# Patient Record
Sex: MALE | Race: WHITE
[De-identification: names, ages, dates, MRNs, and addresses within clinical notes are randomized per-mention and may not be internally consistent; named-entity substitution may affect disease eponyms.]

---

## 2018-04-10 ENCOUNTER — HOSPITAL ENCOUNTER (OUTPATIENT)
Dept: HOSPITAL 76 - LAB.F | Age: 83
End: 2018-04-10
Attending: INTERNAL MEDICINE
Payer: MEDICARE

## 2018-04-10 DIAGNOSIS — I10: Primary | ICD-10-CM

## 2018-04-10 DIAGNOSIS — E78.5: ICD-10-CM

## 2018-04-10 DIAGNOSIS — I48.2: ICD-10-CM

## 2018-04-10 PROCEDURE — 80053 COMPREHEN METABOLIC PANEL: CPT

## 2018-04-10 PROCEDURE — 83721 ASSAY OF BLOOD LIPOPROTEIN: CPT

## 2018-04-10 PROCEDURE — 36415 COLL VENOUS BLD VENIPUNCTURE: CPT

## 2018-04-10 PROCEDURE — 80061 LIPID PANEL: CPT

## 2018-04-10 PROCEDURE — 84443 ASSAY THYROID STIM HORMONE: CPT

## 2018-04-11 LAB
ALBUMIN DIAFP-MCNC: 4.1 G/DL (ref 3.2–5.5)
ALBUMIN/GLOB SERPL: 1.6 {RATIO} (ref 1–2.2)
ALP SERPL-CCNC: 64 IU/L (ref 42–121)
ALT SERPL W P-5'-P-CCNC: 19 IU/L (ref 10–60)
ANION GAP SERPL CALCULATED.4IONS-SCNC: 5 MMOL/L (ref 6–13)
AST SERPL W P-5'-P-CCNC: 22 IU/L (ref 10–42)
BILIRUB BLD-MCNC: 0.8 MG/DL (ref 0.2–1)
BUN SERPL-MCNC: 17 MG/DL (ref 6–20)
CALCIUM UR-MCNC: 8.9 MG/DL (ref 8.5–10.3)
CHLORIDE SERPL-SCNC: 104 MMOL/L (ref 101–111)
CHOLEST SERPL-MCNC: 112 MG/DL
CO2 SERPL-SCNC: 29 MMOL/L (ref 21–32)
CREAT SERPLBLD-SCNC: 1.2 MG/DL (ref 0.6–1.2)
GFRSERPLBLD MDRD-ARVRAT: 57 ML/MIN/{1.73_M2} (ref 89–?)
GLOBULIN SER-MCNC: 2.6 G/DL (ref 2.1–4.2)
GLUCOSE SERPL-MCNC: 112 MG/DL (ref 70–100)
HDLC SERPL-MCNC: 33 MG/DL
HDLC SERPL: 3.4 {RATIO} (ref ?–5)
LDLC SERPL CALC-MCNC: 49 MG/DL
LDLC/HDLC SERPL: 1.5 {RATIO} (ref ?–3.6)
PROT SPEC-MCNC: 6.7 G/DL (ref 6.7–8.2)
SODIUM SERPLBLD-SCNC: 138 MMOL/L (ref 135–145)
VLDLC SERPL-SCNC: 30 MG/DL

## 2018-09-23 ENCOUNTER — HOSPITAL ENCOUNTER (EMERGENCY)
Dept: HOSPITAL 76 - ED | Age: 83
Discharge: ANOTHER HOSPITAL | End: 2018-09-23
Payer: MEDICARE

## 2018-09-23 VITALS — SYSTOLIC BLOOD PRESSURE: 172 MMHG | DIASTOLIC BLOOD PRESSURE: 51 MMHG

## 2018-09-23 DIAGNOSIS — Z79.01: ICD-10-CM

## 2018-09-23 DIAGNOSIS — I48.2: ICD-10-CM

## 2018-09-23 DIAGNOSIS — I50.9: ICD-10-CM

## 2018-09-23 DIAGNOSIS — Z86.73: ICD-10-CM

## 2018-09-23 DIAGNOSIS — R00.1: Primary | ICD-10-CM

## 2018-09-23 DIAGNOSIS — I11.0: ICD-10-CM

## 2018-09-23 DIAGNOSIS — J44.9: ICD-10-CM

## 2018-09-23 LAB
ALBUMIN DIAFP-MCNC: 4.2 G/DL (ref 3.2–5.5)
ALBUMIN/GLOB SERPL: 1.4 {RATIO} (ref 1–2.2)
ALP SERPL-CCNC: 85 IU/L (ref 42–121)
ALT SERPL W P-5'-P-CCNC: 15 IU/L (ref 10–60)
ANION GAP SERPL CALCULATED.4IONS-SCNC: 8 MMOL/L (ref 6–13)
AST SERPL W P-5'-P-CCNC: 20 IU/L (ref 10–42)
BASOPHILS NFR BLD AUTO: 0 10^3/UL (ref 0–0.1)
BASOPHILS NFR BLD AUTO: 0.6 %
BILIRUB BLD-MCNC: 1.6 MG/DL (ref 0.2–1)
BUN SERPL-MCNC: 16 MG/DL (ref 6–20)
CALCIUM UR-MCNC: 8.7 MG/DL (ref 8.5–10.3)
CHLORIDE SERPL-SCNC: 101 MMOL/L (ref 101–111)
CLARITY UR REFRACT.AUTO: CLEAR
CO2 SERPL-SCNC: 29 MMOL/L (ref 21–32)
CREAT SERPLBLD-SCNC: 1.2 MG/DL (ref 0.6–1.2)
EOSINOPHIL # BLD AUTO: 0.1 10^3/UL (ref 0–0.7)
EOSINOPHIL NFR BLD AUTO: 1.9 %
ERYTHROCYTE [DISTWIDTH] IN BLOOD BY AUTOMATED COUNT: 14.6 % (ref 12–15)
GFRSERPLBLD MDRD-ARVRAT: 57 ML/MIN/{1.73_M2} (ref 89–?)
GLOBULIN SER-MCNC: 3.1 G/DL (ref 2.1–4.2)
GLUCOSE SERPL-MCNC: 151 MG/DL (ref 70–100)
GLUCOSE UR QL STRIP.AUTO: NEGATIVE MG/DL
HGB UR QL STRIP: 14.1 G/DL (ref 14–18)
INR PPP: 1.8 (ref 0.8–1.2)
KETONES UR QL STRIP.AUTO: NEGATIVE MG/DL
LIPASE SERPL-CCNC: 34 U/L (ref 22–51)
LYMPHOCYTES # SPEC AUTO: 1.6 10^3/UL (ref 1.5–3.5)
LYMPHOCYTES NFR BLD AUTO: 23.6 %
MAGNESIUM SERPL-MCNC: 1.9 MG/DL (ref 1.7–2.8)
MCH RBC QN AUTO: 29.9 PG (ref 27–31)
MCHC RBC AUTO-ENTMCNC: 33.4 G/DL (ref 32–36)
MCV RBC AUTO: 89.4 FL (ref 80–94)
MONOCYTES # BLD AUTO: 0.4 10^3/UL (ref 0–1)
MONOCYTES NFR BLD AUTO: 6.3 %
NEUTROPHILS # BLD AUTO: 4.7 10^3/UL (ref 1.5–6.6)
NEUTROPHILS # SNV AUTO: 7 X10^3/UL (ref 4.8–10.8)
NEUTROPHILS NFR BLD AUTO: 67.6 %
NITRITE UR QL STRIP.AUTO: NEGATIVE
PDW BLD AUTO: 8.4 FL (ref 7.4–11.4)
PH UR STRIP.AUTO: 7 PH (ref 5–7.5)
PLATELET # BLD: 197 10^3/UL (ref 130–450)
PROT SPEC-MCNC: 7.3 G/DL (ref 6.7–8.2)
PROT UR STRIP.AUTO-MCNC: NEGATIVE MG/DL
PROTHROM ACT/NOR PPP: 20.1 SECS (ref 9.9–12.6)
RBC # UR STRIP.AUTO: NEGATIVE /UL
RBC MAR: 4.71 10^6/UL (ref 4.7–6.1)
SODIUM SERPLBLD-SCNC: 138 MMOL/L (ref 135–145)
SP GR UR STRIP.AUTO: 1.01 (ref 1–1.03)
UROBILINOGEN UR QL STRIP.AUTO: (no result) E.U./DL
UROBILINOGEN UR STRIP.AUTO-MCNC: NEGATIVE MG/DL

## 2018-09-23 PROCEDURE — 85025 COMPLETE CBC W/AUTO DIFF WBC: CPT

## 2018-09-23 PROCEDURE — 83690 ASSAY OF LIPASE: CPT

## 2018-09-23 PROCEDURE — 85610 PROTHROMBIN TIME: CPT

## 2018-09-23 PROCEDURE — 93005 ELECTROCARDIOGRAM TRACING: CPT

## 2018-09-23 PROCEDURE — 84484 ASSAY OF TROPONIN QUANT: CPT

## 2018-09-23 PROCEDURE — 83880 ASSAY OF NATRIURETIC PEPTIDE: CPT

## 2018-09-23 PROCEDURE — 36415 COLL VENOUS BLD VENIPUNCTURE: CPT

## 2018-09-23 PROCEDURE — 81001 URINALYSIS AUTO W/SCOPE: CPT

## 2018-09-23 PROCEDURE — 71046 X-RAY EXAM CHEST 2 VIEWS: CPT

## 2018-09-23 PROCEDURE — 87086 URINE CULTURE/COLONY COUNT: CPT

## 2018-09-23 PROCEDURE — 81003 URINALYSIS AUTO W/O SCOPE: CPT

## 2018-09-23 PROCEDURE — 99285 EMERGENCY DEPT VISIT HI MDM: CPT

## 2018-09-23 PROCEDURE — 96374 THER/PROPH/DIAG INJ IV PUSH: CPT

## 2018-09-23 PROCEDURE — 83735 ASSAY OF MAGNESIUM: CPT

## 2018-09-23 PROCEDURE — 80053 COMPREHEN METABOLIC PANEL: CPT

## 2018-09-23 PROCEDURE — 99284 EMERGENCY DEPT VISIT MOD MDM: CPT

## 2018-09-23 PROCEDURE — 85730 THROMBOPLASTIN TIME PARTIAL: CPT

## 2018-09-23 NOTE — ED PHYSICIAN DOCUMENTATION
PD HPI DYSPNEA





- Stated complaint


Stated Complaint: LOW PULSE





- Chief complaint


Chief Complaint: Cardiac





- History obtained from


History obtained from: Patient, Family





- History of Present Illness


Timing - onset: How many days ago (family has noted the patient having some 

general fatigue and mild dyspnea on exertion for several days. He had ankle 

edema yesterday. Took vitals at home and noted HR slow at about 40 and BP 

elevated at 200 systolic. Denies chest pain.)


Timing - onset during: Light activity


Timing - details: Gradual onset, Waxing and waning


Inciting event(s): Other (He does have history of COPD and has noticed dyspnea 

for the last month or 2 which has increased in the last week.  He notices it 

with mild exertion.  He has not noticed orthopnea.  Denies any chest pain with 

that.).  No: Out of meds, URI


Improved by: Rest


Worsened by: Exertion.  No: Laying flat, Coughing


Associated symptoms: Palpitations (history of irregular heart beat/atrial fib, 

and had been noted to be slower lately.), Bilateral edema (the past couple of 

days).  No: Cough, Wheezing, Chest pain / discomfort, Anxiety


Recently seen: Emergency Dept (He was seen in the emergency room 2 months ago at

[facility] with slow heart rate and some fatigue.  His wife states they were 

told to stop his blood pressure medicine lisinopril.  They did notice his heart 

rate seemed to be better over the next several weeks.  They do had not taken it 

consistently but she states they check his blood pressure and heart rate once or

twice a week.  His blood pressure had been 150s systolic and heart rate about 

60.  They noticed the last few days he has had general fatigue and 2 days of 

ankle swelling.  They noticed his blood pressure to be elevated and his heart 

rate bit slower again.  The patient states he previously had been on diltiazem 

but has not taken that for about 6 months but does still have some medicines at 

home.  He is sure that he would not confuse the medications regarding what he is

taking currently.  His medicine list includes losartan as a blood pressure 

medicine.  That is the one he had stopped taking the last 2 months.)





Review of Systems


Constitutional: denies: Fever, Chills, Myalgias


Nose: denies: Rhinorrhea / runny nose, Congestion


Throat: denies: Sore throat


Cardiac: reports: Palpitations, Pedal edema (couplr of days).  denies: Chest 

pain / pressure, Calf pain


Respiratory: reports: Dyspnea.  denies: Cough


GI: denies: Abdominal Pain, Nausea, Vomiting, Diarrhea


: reports: Frequency.  denies: Dysuria


Skin: denies: Rash, Lesions





PD PAST MEDICAL HISTORY





- Past Medical History


Past Medical History: Yes


Cardiovascular: Hypertension


Respiratory: COPD


Neuro: TIA





- Past Surgical History


Past Surgical History: Yes


General: Bowel surgery, Other


Cardiovascular: Lobectomy


HEENT: Tonsil/Adenoidectomy





- Present Medications


Home Medications: 


                                Ambulatory Orders











 Medication  Instructions  Recorded  Confirmed


 


Albuterol 2.5 mg INH Q4H PRN 09/23/18 09/23/18


 


Apixaban [Eliquis] 5 mg PO 09/23/18 


 


Atorvastatin Calcium 40 mg PO 09/23/18 


 


Brimonidine 0.2% Ophth Drops  09/23/18 





[Alphagan P 0.2% Ophth Drops]   


 


Latanoprost 2.5 ml OP 09/23/18 


 


Losartan [Cozaar] 25 mg PO DAILY 09/23/18 09/23/18


 


Multivitamin [Multiple Vitamins]  09/23/18 09/23/18


 


Tiotropium Bromide [Spiriva]  09/23/18 














- Allergies


Allergies/Adverse Reactions: 


                                    Allergies











Allergy/AdvReac Type Severity Reaction Status Date / Time


 


No Known Drug Allergies Allergy   Verified 09/23/18 15:49














- Social History


Does the pt smoke?: No


Smoking Status: Never smoker





PD ED PE NORMAL





- Vitals


Vital signs reviewed: Yes





- General


General: Alert and oriented X 3, No acute distress, Well developed/nourished, 

Other (lying back on cart comfortably. )





- HEENT


HEENT: Pharynx benign





- Neck


Neck: Supple, no meningeal sign, No adenopathy





- Cardiac


Cardiac: No rub, Other (irregular and bardycardic at 40-60 range).  No: RRR, No 

murmur (mild murmur c/w AS.)





- Respiratory


Respiratory: No respiratory distress.  No: Clear bilaterally (fine crackles at 

base only. )





- Abdomen


Abdomen: Soft, Non tender





- Male 


Male : Deferred





- Rectal


Rectal: Deferred





- Back


Back: No CVA TTP





- Derm


Derm: Normal color, Warm and dry





- Extremities


Extremities: No deformity, No tenderness to palpate, Normal ROM s pain, No calf 

tenderness / cord, Other (1+ edema in both lower legs and ankles. )





- Neuro


Neuro: Alert and oriented X 3, No motor deficit, Normal speech





Results





- Vitals


Vitals: 


                               Vital Signs - 24 hr











  09/23/18 09/23/18 09/23/18





  15:40 16:56 18:09


 


Temperature 36.5 C  


 


Heart Rate 40 L 39 L 54 L


 


Respiratory 15 15 19





Rate   


 


Blood Pressure 200/65 H 173/54 H 184/61 H


 


O2 Saturation 95 98 98








                                     Oxygen











O2 Source                      Room air

















- Labs


Labs: 


                                Laboratory Tests











  09/23/18 09/23/18 09/23/18





  15:50 15:50 15:50


 


WBC  7.0  


 


RBC  4.71  


 


Hgb  14.1  


 


Hct  42.1  


 


MCV  89.4  


 


MCH  29.9  


 


MCHC  33.4  


 


RDW  14.6  


 


Plt Count  197  


 


MPV  8.4  


 


Neut # (Auto)  4.7  


 


Lymph # (Auto)  1.6  


 


Mono # (Auto)  0.4  


 


Eos # (Auto)  0.1  


 


Baso # (Auto)  0.0  


 


Absolute Nucleated RBC  0.00  


 


Nucleated RBC %  0.1  


 


PT   20.1 H 


 


INR   1.8 H 


 


APTT   34.9 H 


 


Sodium    138


 


Potassium    3.4 L


 


Chloride    101


 


Carbon Dioxide    29


 


Anion Gap    8.0


 


BUN    16


 


Creatinine    1.2


 


Estimated GFR (MDRD)    57 L


 


Glucose    151 H


 


Calcium    8.7


 


Magnesium    1.9


 


Total Bilirubin    1.6 H


 


AST    20


 


ALT    15


 


Alkaline Phosphatase    85


 


Troponin I   


 


B-Natriuretic Peptide   


 


Total Protein    7.3


 


Albumin    4.2


 


Globulin    3.1


 


Albumin/Globulin Ratio    1.4


 


Lipase    34


 


Urine Color   


 


Urine Clarity   


 


Urine pH   


 


Ur Specific Gravity   


 


Urine Protein   


 


Urine Glucose (UA)   


 


Urine Ketones   


 


Urine Occult Blood   


 


Urine Nitrite   


 


Urine Bilirubin   


 


Urine Urobilinogen   


 


Ur Leukocyte Esterase   


 


Ur Microscopic Review   


 


Urine Culture Comments   














  09/23/18 09/23/18 09/23/18





  15:50 15:50 16:30


 


WBC   


 


RBC   


 


Hgb   


 


Hct   


 


MCV   


 


MCH   


 


MCHC   


 


RDW   


 


Plt Count   


 


MPV   


 


Neut # (Auto)   


 


Lymph # (Auto)   


 


Mono # (Auto)   


 


Eos # (Auto)   


 


Baso # (Auto)   


 


Absolute Nucleated RBC   


 


Nucleated RBC %   


 


PT   


 


INR   


 


APTT   


 


Sodium   


 


Potassium   


 


Chloride   


 


Carbon Dioxide   


 


Anion Gap   


 


BUN   


 


Creatinine   


 


Estimated GFR (MDRD)   


 


Glucose   


 


Calcium   


 


Magnesium   


 


Total Bilirubin   


 


AST   


 


ALT   


 


Alkaline Phosphatase   


 


Troponin I  0.06  


 


B-Natriuretic Peptide   830 H 


 


Total Protein   


 


Albumin   


 


Globulin   


 


Albumin/Globulin Ratio   


 


Lipase   


 


Urine Color    YELLOW


 


Urine Clarity    CLEAR


 


Urine pH    7.0


 


Ur Specific Gravity    1.010


 


Urine Protein    NEGATIVE


 


Urine Glucose (UA)    NEGATIVE


 


Urine Ketones    NEGATIVE


 


Urine Occult Blood    NEGATIVE


 


Urine Nitrite    NEGATIVE


 


Urine Bilirubin    NEGATIVE


 


Urine Urobilinogen    0.2 (NORMAL)


 


Ur Leukocyte Esterase    NEGATIVE


 


Ur Microscopic Review    NOT INDICATED


 


Urine Culture Comments    NOT INDICATED














PD MEDICAL DECISION MAKING





- ED course


Complexity details: reviewed results, considered differential (He is not on any 

rate limiting medications nor antiarrhythmics.  He does have a slow heart rate 

in his newer for him with the parent family stating his heart rate has been in 

the 60 range for the last couple of months and is just slower noted the last few

 days.  He is apparently in congestive failure related to this.  I talked with 

cardiology at [facility], Dr. Silva who states the patient needs to be 

assessed for potential pacemaker placement and refers to their hospitalist.  I 

talked to Dr. Banda hospitalist at [facility] who accepts transfer.), d/w 

patient, d/w family





- Sepsis Event


Vital Signs: 


                               Vital Signs - 24 hr











  09/23/18 09/23/18 09/23/18





  15:40 16:56 18:09


 


Temperature 36.5 C  


 


Heart Rate 40 L 39 L 54 L


 


Respiratory 15 15 19





Rate   


 


Blood Pressure 200/65 H 173/54 H 184/61 H


 


O2 Saturation 95 98 98








                                     Oxygen











O2 Source                      Room air

















Departure





- Departure


Disposition: 02 Transfer Acute Care Hosp


Clinical Impression: 


 Bradycardia, Atrial fibrillation, chronic, Sick sinus syndrome





Acute exacerbation of CHF (congestive heart failure)


Qualifiers:


 Heart failure type: unspecified Qualified Code(s): I50.9 - Heart failure, 

unspecified





Condition: Stable


Record reviewed to determine appropriate education?: Yes

## 2018-09-23 NOTE — XRAY REPORT
Reason:  dyspnea and some leg swelling

Procedure Date:  09/23/2018   

Accession Number:  [id number] / [id number]                    

Procedure:  XR  - Chest 2 View X-Ray CPT Code:  87364

 

FULL RESULT:

 

 

EXAM:

CHEST RADIOGRAPHY

 

EXAM DATE: 9/23/2018 04:28 PM.

 

CLINICAL HISTORY: Dyspnea and some leg swelling.

 

COMPARISON: None.

 

TECHNIQUE: 2 views.

 

FINDINGS:

Lungs/Pleura: The lungs are hyperinflated. There is blunting of the right 

costophrenic sulcus. Adjacent streaky right lung base opacities. No focal 

consolidation. No edema. Lungs are hyperinflated. No pneumothorax.

 

Mediastinum: The heart is mildly enlarged.

 

Other: None.

IMPRESSION: Right pleural thickening versus small effusion with adjacent 

right lung base scarring. No focal consolidation or edema.

 

RADIA

## 2018-09-27 ENCOUNTER — HOSPITAL ENCOUNTER (OUTPATIENT)
Dept: HOSPITAL 76 - ED | Age: 83
Setting detail: OBSERVATION
LOS: 1 days | Discharge: HOME | End: 2018-09-28
Attending: NURSE PRACTITIONER | Admitting: INTERNAL MEDICINE
Payer: MEDICARE

## 2018-09-27 DIAGNOSIS — J44.9: ICD-10-CM

## 2018-09-27 DIAGNOSIS — G45.9: Primary | ICD-10-CM

## 2018-09-27 DIAGNOSIS — I10: ICD-10-CM

## 2018-09-27 DIAGNOSIS — I48.2: ICD-10-CM

## 2018-09-27 DIAGNOSIS — I34.0: ICD-10-CM

## 2018-09-27 DIAGNOSIS — Z79.82: ICD-10-CM

## 2018-09-27 DIAGNOSIS — R35.1: ICD-10-CM

## 2018-09-27 DIAGNOSIS — H91.90: ICD-10-CM

## 2018-09-27 DIAGNOSIS — Z87.891: ICD-10-CM

## 2018-09-27 DIAGNOSIS — Z79.01: ICD-10-CM

## 2018-09-27 DIAGNOSIS — Z95.0: ICD-10-CM

## 2018-09-27 LAB
ANION GAP SERPL CALCULATED.4IONS-SCNC: 8 MMOL/L (ref 6–13)
BASOPHILS NFR BLD AUTO: 0 10^3/UL (ref 0–0.1)
BASOPHILS NFR BLD AUTO: 0.3 %
BUN SERPL-MCNC: 15 MG/DL (ref 6–20)
CALCIUM UR-MCNC: 9.1 MG/DL (ref 8.5–10.3)
CHLORIDE SERPL-SCNC: 99 MMOL/L (ref 101–111)
CLARITY UR REFRACT.AUTO: CLEAR
CO2 SERPL-SCNC: 27 MMOL/L (ref 21–32)
CREAT SERPLBLD-SCNC: 1.4 MG/DL (ref 0.6–1.2)
EOSINOPHIL # BLD AUTO: 0.1 10^3/UL (ref 0–0.7)
EOSINOPHIL NFR BLD AUTO: 1.7 %
ERYTHROCYTE [DISTWIDTH] IN BLOOD BY AUTOMATED COUNT: 14.4 % (ref 12–15)
GFRSERPLBLD MDRD-ARVRAT: 48 ML/MIN/{1.73_M2} (ref 89–?)
GLUCOSE SERPL-MCNC: 135 MG/DL (ref 70–100)
GLUCOSE UR QL STRIP.AUTO: NEGATIVE MG/DL
HGB UR QL STRIP: 14.9 G/DL (ref 14–18)
INR PPP: 1.6 (ref 0.8–1.2)
KETONES UR QL STRIP.AUTO: NEGATIVE MG/DL
LYMPHOCYTES # SPEC AUTO: 1.1 10^3/UL (ref 1.5–3.5)
LYMPHOCYTES NFR BLD AUTO: 12.4 %
MCH RBC QN AUTO: 30.1 PG (ref 27–31)
MCHC RBC AUTO-ENTMCNC: 34.1 G/DL (ref 32–36)
MCV RBC AUTO: 88.4 FL (ref 80–94)
MONOCYTES # BLD AUTO: 0.6 10^3/UL (ref 0–1)
MONOCYTES NFR BLD AUTO: 7 %
NEUTROPHILS # BLD AUTO: 6.7 10^3/UL (ref 1.5–6.6)
NEUTROPHILS # SNV AUTO: 8.6 X10^3/UL (ref 4.8–10.8)
NEUTROPHILS NFR BLD AUTO: 78.6 %
NITRITE UR QL STRIP.AUTO: NEGATIVE
PDW BLD AUTO: 8.2 FL (ref 7.4–11.4)
PH UR STRIP.AUTO: 6 PH (ref 5–7.5)
PLATELET # BLD: 181 10^3/UL (ref 130–450)
PROT UR STRIP.AUTO-MCNC: NEGATIVE MG/DL
PROTHROM ACT/NOR PPP: 17.5 SECS (ref 9.9–12.6)
RBC # UR STRIP.AUTO: NEGATIVE /UL
RBC MAR: 4.94 10^6/UL (ref 4.7–6.1)
SODIUM SERPLBLD-SCNC: 134 MMOL/L (ref 135–145)
SP GR UR STRIP.AUTO: 1.02 (ref 1–1.03)
UROBILINOGEN UR QL STRIP.AUTO: (no result) E.U./DL
UROBILINOGEN UR STRIP.AUTO-MCNC: NEGATIVE MG/DL

## 2018-09-27 PROCEDURE — 70498 CT ANGIOGRAPHY NECK: CPT

## 2018-09-27 PROCEDURE — 93306 TTE W/DOPPLER COMPLETE: CPT

## 2018-09-27 PROCEDURE — 87086 URINE CULTURE/COLONY COUNT: CPT

## 2018-09-27 PROCEDURE — 70496 CT ANGIOGRAPHY HEAD: CPT

## 2018-09-27 PROCEDURE — 85025 COMPLETE CBC W/AUTO DIFF WBC: CPT

## 2018-09-27 PROCEDURE — 81003 URINALYSIS AUTO W/O SCOPE: CPT

## 2018-09-27 PROCEDURE — 80053 COMPREHEN METABOLIC PANEL: CPT

## 2018-09-27 PROCEDURE — 80048 BASIC METABOLIC PNL TOTAL CA: CPT

## 2018-09-27 PROCEDURE — 99284 EMERGENCY DEPT VISIT MOD MDM: CPT

## 2018-09-27 PROCEDURE — 93005 ELECTROCARDIOGRAM TRACING: CPT

## 2018-09-27 PROCEDURE — 81001 URINALYSIS AUTO W/SCOPE: CPT

## 2018-09-27 PROCEDURE — 36415 COLL VENOUS BLD VENIPUNCTURE: CPT

## 2018-09-27 PROCEDURE — 70450 CT HEAD/BRAIN W/O DYE: CPT

## 2018-09-27 PROCEDURE — 83735 ASSAY OF MAGNESIUM: CPT

## 2018-09-27 PROCEDURE — 85610 PROTHROMBIN TIME: CPT

## 2018-09-27 PROCEDURE — 84484 ASSAY OF TROPONIN QUANT: CPT

## 2018-09-27 RX ADMIN — SODIUM CHLORIDE, PRESERVATIVE FREE SCH ML: 5 INJECTION INTRAVENOUS at 15:39

## 2018-09-27 RX ADMIN — APIXABAN SCH MG: 5 TABLET, FILM COATED ORAL at 20:03

## 2018-09-27 RX ADMIN — BRIMONIDINE TARTRATE SCH DROPS: 2 SOLUTION OPHTHALMIC at 20:04

## 2018-09-27 RX ADMIN — SODIUM CHLORIDE SCH MLS/HR: 9 INJECTION, SOLUTION INTRAVENOUS at 15:39

## 2018-09-27 NOTE — ED PHYSICIAN DOCUMENTATION
History of Present Illness





- Stated complaint


Stated Complaint: CONFUSION/DISORIENTED





- Chief complaint


Chief Complaint: Neuro





- Additonal information


Additional information: 





hx from pt


86 male


2 days sp PPM placement at [facility]


to ED today for 10-15 min of expressive aphasia


no facial droop or unilateral numbness or weakness





Review of Systems


Constitutional: denies: Fever


Throat: denies: Sore throat


Cardiac: denies: Chest pain / pressure


Respiratory: denies: Dyspnea


GI: denies: Abdominal Pain


Neurologic: reports: Difficulty speaking.  denies: Focal weakness, Numbness, 

Headache


Endocrine: reports: Easy bruising / bleeding (off eliquis X 2 days for PPM 

restarted today)





PD PAST MEDICAL HISTORY





- Past Medical History


Cardiovascular: Hypertension


Respiratory: COPD


Neuro: TIA





- Past Surgical History


Past Surgical History: Yes


General: Bowel surgery, Other


Cardiovascular: Lobectomy


HEENT: Tonsil/Adenoidectomy





- Present Medications


Home Medications: 


                                Ambulatory Orders











 Medication  Instructions  Recorded  Confirmed


 


Albuterol 2.5 mg INH Q4H PRN 09/23/18 09/27/18


 


Apixaban [Eliquis] 5 mg PO BID 09/23/18 09/27/18


 


Atorvastatin Calcium 40 mg PO QPM 09/23/18 09/27/18


 


Brimonidine 0.2% Ophth Drops 1 drops EACHEYE BID 09/23/18 09/27/18





[Alphagan P 0.2% Ophth Drops]   


 


Latanoprost 1 drops EACHEYE QPM 09/23/18 09/27/18


 


Losartan [Cozaar] 25 mg PO DAILY 09/23/18 09/27/18


 


Multivitamin [Multiple Vitamins] 1 tab PO DAILY 09/23/18 09/27/18


 


Tiotropium Bromide [Spiriva] 2 cap INH DAILY 09/23/18 09/27/18


 


Metoprolol Succinate 12.5 mg PO DAILY 09/27/18 09/27/18














- Allergies


Allergies/Adverse Reactions: 


                                    Allergies











Allergy/AdvReac Type Severity Reaction Status Date / Time


 


No Known Drug Allergies Allergy   Verified 09/27/18 10:24














- Social History


Does the pt smoke?: No


Smoking Status: Never smoker





PD ED PE NORMAL





- Vitals


Vital signs reviewed: Yes





- Neck


Neck: Supple, no meningeal sign





- Cardiac


Cardiac: RRR, Other (PPM site seems to be healing well)





- Respiratory


Respiratory: No respiratory distress, Clear bilaterally





- Derm


Derm: Normal color





- Neuro


Neuro: Alert and oriented X 3, CNs 2-12 intact, No motor deficit, No sensory 

deficit, Normal speech, Other (right now NIHSS zero)





Results





- Vitals


Vitals: 


                               Vital Signs - 24 hr











  09/27/18





  10:20


 


Temperature 36.3 C L


 


Heart Rate 70


 


Respiratory 16





Rate 


 


Blood Pressure 152/70 H


 


O2 Saturation 98








                                     Oxygen











O2 Source                      Room air

















- EKG (time done)


  ** 1037


Rate: Rate (enter#) (60)


Rhythm: Other (appears to be paced at 60 - wide complex reg and cards states 

that is his PPM rate)





- Labs


Labs: 


                                Laboratory Tests











  09/27/18 09/27/18 09/27/18





  11:24 11:24 11:24


 


WBC  8.6  


 


RBC  4.94  


 


Hgb  14.9  


 


Hct  43.6  


 


MCV  88.4  


 


MCH  30.1  


 


MCHC  34.1  


 


RDW  14.4  


 


Plt Count  181  


 


MPV  8.2  


 


Neut # (Auto)  6.7 H  


 


Lymph # (Auto)  1.1 L  


 


Mono # (Auto)  0.6  


 


Eos # (Auto)  0.1  


 


Baso # (Auto)  0.0  


 


Absolute Nucleated RBC  0.00  


 


Nucleated RBC %  0.0  


 


PT   17.5 H 


 


INR   1.6 H 


 


Sodium    134 L


 


Potassium    4.1


 


Chloride    99 L


 


Carbon Dioxide    27


 


Anion Gap    8.0


 


BUN    15


 


Creatinine    1.4 H


 


Estimated GFR (MDRD)    48 L


 


Glucose    135 H


 


Calcium    9.1


 


Urine Color   


 


Urine Clarity   


 


Urine pH   


 


Ur Specific Gravity   


 


Urine Protein   


 


Urine Glucose (UA)   


 


Urine Ketones   


 


Urine Occult Blood   


 


Urine Nitrite   


 


Urine Bilirubin   


 


Urine Urobilinogen   


 


Ur Leukocyte Esterase   


 


Ur Microscopic Review   


 


Urine Culture Comments   














  09/27/18





  12:00


 


WBC 


 


RBC 


 


Hgb 


 


Hct 


 


MCV 


 


MCH 


 


MCHC 


 


RDW 


 


Plt Count 


 


MPV 


 


Neut # (Auto) 


 


Lymph # (Auto) 


 


Mono # (Auto) 


 


Eos # (Auto) 


 


Baso # (Auto) 


 


Absolute Nucleated RBC 


 


Nucleated RBC % 


 


PT 


 


INR 


 


Sodium 


 


Potassium 


 


Chloride 


 


Carbon Dioxide 


 


Anion Gap 


 


BUN 


 


Creatinine 


 


Estimated GFR (MDRD) 


 


Glucose 


 


Calcium 


 


Urine Color  YELLOW


 


Urine Clarity  CLEAR


 


Urine pH  6.0


 


Ur Specific Gravity  1.020


 


Urine Protein  NEGATIVE


 


Urine Glucose (UA)  NEGATIVE


 


Urine Ketones  NEGATIVE


 


Urine Occult Blood  NEGATIVE


 


Urine Nitrite  NEGATIVE


 


Urine Bilirubin  NEGATIVE


 


Urine Urobilinogen  0.2 (NORMAL)


 


Ur Leukocyte Esterase  NEGATIVE


 


Ur Microscopic Review  NOT INDICATED


 


Urine Culture Comments  NOT INDICATED














- Rads (name of study)


  ** [facility]


Radiology: See rad report (no acute)





PD MEDICAL DECISION MAKING





- ED course


ED course: 





spoke to cardio who does not feel pt sx are due to PPM mechanically at least 

(but had to hold eliquis)


abcd2 score 4 


merits obs for serial neruo exams, echo, vessel imaging


called hospitalist at 1250








- Sepsis Event


Vital Signs: 


                               Vital Signs - 24 hr











  09/27/18





  10:20


 


Temperature 36.3 C L


 


Heart Rate 70


 


Respiratory 16





Rate 


 


Blood Pressure 152/70 H


 


O2 Saturation 98








                                     Oxygen











O2 Source                      Room air

















Departure





- Departure


Disposition: ED Place in Observation


Clinical Impression: 


 TIA (transient ischemic attack)

## 2018-09-27 NOTE — HISTORY & PHYSICAL EXAMINATION
Chief Complaint





- Chief Complaint


Chief Complaint: confusion, memory loss





History of Present Illness





- Admitted From


Admitted From:: ED





- History Obtained From


Records Reviewed: yes


History obtained from: chart review, patient, family


Exam Limitations: none





- History of Present Illness


HPI Comment/Other: 


Adrien Naidu is an 86-year old male with a past medical history of hypertension,

atrial fibrillation, status post pacemaker, COPD, TIA, and nocturia.  The 

patient presented to the ED with his wife who drove him here for TIA symptoms. 

The patient states that he awoke this morning feeling his normal self, and 

suddenly could not speak with acute memory loss.  He did not have any associated

symptoms such as shortness of breath, nausea, vomiting, dizziness, or chest 

pain.  His wife was present and did not notice any ataxia or facial droop.  The 

patient denies a new cough or problems with swallowing.  Imaging of the he

ad/neck did not show any acute abnormalities or evidence of bleeding.  Upon my 

exam the patient has no focal-neuro deficits and has had a resolution of his 

symptoms.  The patient will be admitted to observation for neuro checks, and an 

echocardiogram.





History





- Past Medical History


Cardiovascular: reports: Hypertension


Respiratory: reports: COPD


Neuro: reports: TIA





- Past Surgical History


General: reports: Bowel surgery, Other


Cardiovascular: reports: Lobectomy


HEENT: reports: Tonsil/Adenoidectomy





- Family & Social History


Family History: Mother: , Cancer, Father: , CAD, MI, Sister: 

Alive and Well


Family History Comment/Other: The patient's mother  from cancer, his father 

 from CAD, MI.  He has 2 living sisters who are well, without any known 

chronic illnesses.


Living arrangement: At home


Living Situation: With spouse/s.o.


Social History Notes: The patient is retired from a [employer] in which he worked

there for 40 years.  He was in the service and was stationed in [employer].  He and 

his wife had a son and a daughter.  He and his wife just recently moved in with 

[place], their daughter and their son-in-law and are independent.  The patient 

denies current tobacco, alcohol, or illicit drug use.  He admits to a history of

tobacco dependence from age [age].  He wishes to be a FULL code.





- Substance History


Use: Uses substance without health or social issues: NONE


Abuse: Recurrent use of substance despite neg consequences: NONE


Dependence: Experiences withdrawal or developed tolerances: NONE





- POLST


Patient has POLST: No


POLST Status: Full Code





Meds/Allgy





- Home Medications


Home Medications: 


                                Ambulatory Orders











 Medication  Instructions  Recorded  Confirmed


 


Albuterol 2.5 mg INH Q4H PRN 18


 


Apixaban [Eliquis] 5 mg PO BID 18


 


Atorvastatin Calcium 40 mg PO QPM 18


 


Brimonidine 0.2% Ophth Drops 1 drops EACHEYE BID 18





[Alphagan P 0.2% Ophth Drops]   


 


Latanoprost 1 drops EACHEYE QPM 18


 


Losartan [Cozaar] 25 mg PO DAILY 18


 


Multivitamin [Multiple Vitamins] 1 tab PO DAILY 18


 


Tiotropium Bromide [Spiriva] 2 cap INH DAILY 18


 


Metoprolol Succinate 12.5 mg PO DAILY 18


 


Aspirin [Aspirin EC] 81 mg PO DAILY #30 tablet. 18 














- Allergies


Allergies/Adverse Reactions: 


                                    Allergies











Allergy/AdvReac Type Severity Reaction Status Date / Time


 


No Known Drug Allergies Allergy   Verified 18 10:24














Review of Systems





- Constitutional


Constitutional: reports: Fatigue, Weakness





- Eyes


Eyes: reports: Corrective lenses





- Ears, Nose & Throat


Ears, Nose & Throat: reports: Hearing loss, Hearing aids, Postnasal drainage





- Cardiovascular


Cariovascular: reports: Lightheadedness





- Genitourinary


Genitourinary: reports: Nocturia





- Neurological


Neurological: reports: General weakness, Dizziness, Memory problems, Pre-

existing deficit





- All Other Systems


All Other Systems: reports: Reviewed and negative


Prior Level of Functionality: 


Independent without DME devices.  Was previously mowing the lawn at home.





Exam





- Vital Signs


Reviewed Vital Signs: Yes


Vital Signs: 





                                Vital Signs x48h











  Temp Pulse Resp BP Pulse Ox


 


 18 10:20  36.3 C L  70  16  152/70 H  98














- Physical Exam


General Appearance: positive: No acute distress, Alert


Eyes Bilateral: positive: Normal inspection


ENT: positive: ENT inspection nml, No signs of dehydration


Neck: positive: Nml inspection, Thyroid nml, No JVD


Respiratory: positive: Chest non-tender, No respiratory distress, Breath sounds 

nml


Cardiovascular: positive: Regular rate & rhythm





Conclusion/Plan





- Problem List


(1) TIA (transient ischemic attack)


Conclusion/Plan: 


The patient states that he awoke this morning feeling his normal self, and 

suddenly could not speak with acute memory loss.  He did not have any associated

symptoms such as shortness of breath, nausea, vomiting, dizziness, or chest 

pain.  His wife was present and did not notice any ataxia or facial droop.  The 

patient denies a new cough or problems with swallowing.  Imaging of the 

head/neck did not show any acute abnormalities or evidence of bleeding. 


Plan:  Continue to monitor overnight, telemetry, obtain an echocardiogram.








(2) Memory loss


Conclusion/Plan: 


The patient had memory loss in addition to his temporary expressive aphasia.  

The patient and his wife state that he could not recall common events or 

situations that should have been easily recalled on a normal day.  This symptom 

is no longer evident. 


Plan:  Continue TIA work up.








(3) Expressive aphasia


Conclusion/Plan: 


The patient was noted to have sluggish speech, with word finding difficulties.  

This symptom went along with some short term memory loss.  This has resolved 

upon my exam and the patient is able to articulate accurately the course of doe

nts that led to this hospital stay.


Plan:  Continue TIA work up.








(4) Chronic anticoagulation


Conclusion/Plan: 


The patient is prescribed Eliquis BID, which was resumed just this morning after

being on hold for the previous 72 hours so that the patient could undergo a 

pacemaker implant.  The patient states that between he and his wife, they are 

extremely compliant with taking their medications on time every day.


Plan:  Continue Eliquis as at home and monitor for recurrent symptoms.








(5) Atrial fibrillation, chronic


Conclusion/Plan: 


The patient has had this for several years.  His current EKG and telemetry show 

a paced rhythm in the 60-70's.  He was previously anticoagulated with warfarin, 

but has recently been on Eliquis with good success.  He had an interruption in 

his Eliquis for about 72 hours to get his pacemaker, but this has been resumed.


Plan:  Continue on telemetry, and continue Eliquis.








- Lab Results


Lab results reviewed: Yes


Fish Bones: 


                                 18 05:56





                                 18 05:56





- Diagnostic Imaging Results


Diagnostic Imaging Results: positive: Final report reviewed





- EKG Results


EKG Interpreted Independently: Yes


EKG Findings: 





Paced 60-70's.





Core Measures





- Anticipated LOS


I expect patient to be DC'd or transferred within 96 hours.: Yes





- DVT/VTE - Prophylaxis


VTE/DVT Device ordered at admit?: Yes


VTE/DVT Prophylaxis med ordered at admit?: Yes





- Stroke - Rehab Assessment


Rehab services assessment to be ordered?: No


Not Ordered - Medical Reason: Contraindicated





- AMI - Statin at Admit


Aspirin Prescribed on Admit: Yes

## 2018-09-27 NOTE — CT REPORT
Reason:  Expressive aphasia, TIA

Procedure Date:  09/27/2018   

Accession Number:  [id number] / [id number]                    

Procedure:  CT  - Head Angio CPT Code:  

 

FULL RESULT:

 

 

EXAM:

CT ANGIOGRAM HEAD.

CT SCAN OF THE HEAD WITH CONTRAST.

 

EXAM DATE: 9/27/2018 01:34 PM

 

CLINICAL HISTORY: Expressive aphasia, TIA.

 

COMPARISON: Noncontrast CT head 09/27/2018

 

TECHNIQUE:

- CT Scan Head: Using a multidetector scanner, axial images were acquired 

from the foramen magnum to the skull vertex following contrast 

administration.

- CT Angiogram: Using a multidetector scanner, high-resolution axial 

images were acquired from the skull base through vertex following rapid 

infusion of intravenous contrast. Reformats: Multiplanar MIP reformats 

were reconstructed. Nascet criteria used for stenosis measurement.

 

IV Contrast: 100 cc Isovue-300.

 

In accordance with CT protocol optimization, one or more of the following 

dose reduction techniques were utilized for this exam: automated exposure 

control, adjustment of mA and/or KV based on patient size, or use of 

iterative reconstructive technique.

 

FINDINGS:

NON-CONTRAST HEAD:

Performed and dictated separately

 

POST-CONTRAST HEAD: No abnormal enhancement.

 

CT ANGIOGRAM HEAD:

RIGHT:

Internal Carotid artery: Moderate atherosclerosis right carotid siphon, 

maximal stenosis 30-40%.

 

Anterior Cerebral Artery: Patent without significant stenosis, aneurysm, 

or vascular malformation.

Middle Cerebral Artery: Patent without significant stenosis, aneurysm, or 

vascular malformation.

Posterior Cerebral Artery: Patent without significant stenosis, aneurysm, 

or vascular malformation.

Posterior Communicating Artery: Not clearly visualized, likely 

hypoplastic or aplastic.

 

Vertebral Artery: Patent without significant stenosis. No evidence of 

dissection.

 

LEFT:

Internal Carotid artery: Moderate atherosclerosis left carotid siphon, 

maximal stenosis 20-30%.

 

Anterior Cerebral Artery: Patent without significant stenosis, aneurysm, 

or vascular malformation.

Middle Cerebral Artery: Patent without significant stenosis, aneurysm, or 

vascular malformation.

Posterior Cerebral Artery: Patent without significant stenosis, aneurysm, 

or vascular malformation.

Posterior Communicating Artery: Not clearly visualized, likely 

hypoplastic or aplastic.

 

Vertebral Artery: Patent without significant stenosis. No evidence of 

dissection.

 

CENTRAL:

Anterior Communicating Artery: Patent. No aneurysm.

Basilar Artery: Patent without significant stenosis. No aneurysm.

 

DURAL VENOUS SINUSES AND MAJOR CENTRAL VEINS: Patent.

IMPRESSION:

1. Concurrently obtained noncontrast CT head has been dictated separate.

 

2. No abnormal enhancement on the postcontrast CT head.

 

3. No CTA evidence of high-grade stenosis, large vessel occlusion, acute 

dissection, aneurysm, or vascular malformation within intracranial 

arteries.

 

4. Moderate atherosclerosis right carotid siphon, maximal stenosis 30-40%.

 

5. Moderate atherosclerosis left carotid siphon, maximal stenosis 20-30%.

 

 

RADIA

## 2018-09-27 NOTE — CT REPORT
Reason:  Expressive aphasia, TIA

Procedure Date:  09/27/2018   

Accession Number:  [id number] / [id number]                    

Procedure:  CT  - Neck Angio CPT Code:  

 

FULL RESULT:

 

 

EXAM:

CT ANGIOGRAM NECK

 

EXAM DATE: 9/27/2018 01:34 PM.

 

CLINICAL HISTORY: 86-year-old male. Expressive aphasia, TIA.

 

COMPARISON: Noncontrast CT head and CTA head obtained concurrently

 

TECHNIQUE: Routine axial helical imaging was performed from the skull 

base through the aortic arch. Reconstructions: Routine multiplanar 3D MIP 

reconstructions. IV Contrast: 100 cc Isovue 300. Evaluation of arterial 

stenosis is based on a NASCET method of measurement.

 

In accordance with CT protocol optimization, one or more of the following 

dose reduction techniques were utilized for this exam: automated exposure 

control, adjustment of mA and/or KV based on patient size, or use of 

iterative reconstructive technique.

 

FINDINGS:

Mild atherosclerosis aortic arch, no hemodynamically significant 

stenosis. Mild atherosclerosis proximal right subclavian artery, no 

hemodynamically significant stenosis.

 

Right Carotid: Moderate atherosclerosis right carotid bifurcation and 

right carotid bulb, maximal stenosis of approximately 20%, mild by NASCET 

criteria . Approximately 10-20% narrowing origin of the right external 

carotid artery. The common carotid, internal carotid, and external 

carotid arteries are patent. No evidence of dissection

 

Left Carotid: Moderate to severe atherosclerosis left carotid bifurcation 

and left carotid bulb, maximal stenosis of approximately 50%, moderate by 

NASCET criteria. Approximately 30-40% narrowing proximal left external 

carotid artery. The common carotid, internal carotid, and external 

carotid arteries are patent. No evidence of dissection.

 

Vertebrals: The left vertebral artery is minimally dominant. 

Approximately 30-40% narrowing origin of the left vertebral artery. The 

vertebrobasilar system is otherwise unremarkable

 

Intracranial Circulation: Concurrently obtained CTA head has been 

dictated separately.

 

Other: Moderate centrilobular and paraseptal emphysema. Moderate 

multilevel degenerative spondylosis of the visualized spine, no acute 

fracture or malalignment. There is a 1 cm sclerotic lesion within T1 

vertebral body which is nonspecific, may represent a bone island. The 

visualized soft tissues of the neck demonstrate no acute abnormality.

IMPRESSION:

1. No CTA evidence of high-grade stenosis, large vessel occlusion, acute 

dissection, aneurysm, or vascular malformation within extracranial 

arteries. Concurrently obtained CTA head has been dictated simply.

 

2. Moderate atherosclerosis right carotid bifurcation and right carotid 

bulb, maximal stenosis of approximately 20%, mild by NASCET criteria .

 

3. Approximately 10-20% narrowing origin of the right external carotid 

artery.

 

4. Moderate to severe atherosclerosis left carotid bifurcation and left 

carotid bulb, maximal stenosis of approximately 50%, moderate by NASCET 

criteria.

 

5. Approximately 30-40% narrowing proximal left external carotid artery.

 

6. Approximately 30-40% narrowing origin of the left vertebral artery.

 

 

RADIA

## 2018-09-27 NOTE — CT REPORT
Reason:  tia / aphasia / resoleved

Procedure Date:  09/27/2018   

Accession Number:  [id number] / [id number]                    

Procedure:  CT  - Head W/O Stroke Protocol CPT Code:  

 

FULL RESULT:

 

 

EXAM:

CT HEAD

 

EXAM DATE: 9/27/2018 11:23 AM.

 

CLINICAL HISTORY: TIA / aphasia / resolved.

 

COMPARISON: None.

 

TECHNIQUE: Multiaxial CT images were obtained from the foramen magnum to 

the vertex. Reformats: Coronal. IV contrast: None.

 

In accordance with CT protocol optimization, one or more of the following 

dose reduction techniques were utilized for this exam: automated exposure 

control, adjustment of mA and/or KV based on patient size, or use of 

iterative reconstructive technique.

 

FINDINGS:

Parenchyma: No intraparenchymal hemorrhage. No evidence of mass, midline 

shift, or CT findings of acute infarction. Gray-white differentiation is 

distinct. Diffuse chronic microangiopathic white matter changes are 

evident.

 

Extraaxial Spaces: Normal for age. No subdural or epidural collections 

identified.

 

Ventricles: The ventricles and cortical sulci are enlarged, consistent 

with age-related tissue loss.

 

Sinuses and orbits: Imaged paranasal sinuses, orbits, and mastoids show 

no significant abnormality.

 

Bones: No evidence of fracture or calvarial defect.

 

Other: None.

IMPRESSION: Generalized age-related cortical atrophic changes without 

evidence of acute intracranial abnormality. No intracranial hemorrhage, 

mass-effect, or CT evidence of acute infarct.

 

RADIA

 

The call report notification system was initiated by Dr. Musa Johnson 

at 11:29 hrs on 9/27/18.

 

The above findings  were discussed with Dr. Salmon by Dr. Musa Johnson 

at 11:32 hrs on 9/27/18.

## 2018-09-28 VITALS — DIASTOLIC BLOOD PRESSURE: 76 MMHG | SYSTOLIC BLOOD PRESSURE: 177 MMHG

## 2018-09-28 LAB
ALBUMIN DIAFP-MCNC: 3.5 G/DL (ref 3.2–5.5)
ALBUMIN/GLOB SERPL: 1.2 {RATIO} (ref 1–2.2)
ALP SERPL-CCNC: 80 IU/L (ref 42–121)
ALT SERPL W P-5'-P-CCNC: 12 IU/L (ref 10–60)
ANION GAP SERPL CALCULATED.4IONS-SCNC: 7 MMOL/L (ref 6–13)
AST SERPL W P-5'-P-CCNC: 18 IU/L (ref 10–42)
BASOPHILS NFR BLD AUTO: 0 10^3/UL (ref 0–0.1)
BASOPHILS NFR BLD AUTO: 0.8 %
BILIRUB BLD-MCNC: 1 MG/DL (ref 0.2–1)
BUN SERPL-MCNC: 13 MG/DL (ref 6–20)
CALCIUM UR-MCNC: 8.6 MG/DL (ref 8.5–10.3)
CHLORIDE SERPL-SCNC: 104 MMOL/L (ref 101–111)
CO2 SERPL-SCNC: 25 MMOL/L (ref 21–32)
CREAT SERPLBLD-SCNC: 1 MG/DL (ref 0.6–1.2)
EOSINOPHIL # BLD AUTO: 0.2 10^3/UL (ref 0–0.7)
EOSINOPHIL NFR BLD AUTO: 2.7 %
ERYTHROCYTE [DISTWIDTH] IN BLOOD BY AUTOMATED COUNT: 14.2 % (ref 12–15)
GFRSERPLBLD MDRD-ARVRAT: 71 ML/MIN/{1.73_M2} (ref 89–?)
GLOBULIN SER-MCNC: 2.9 G/DL (ref 2.1–4.2)
GLUCOSE SERPL-MCNC: 105 MG/DL (ref 70–100)
HGB UR QL STRIP: 14.5 G/DL (ref 14–18)
INR PPP: 1.5 (ref 0.8–1.2)
LYMPHOCYTES # SPEC AUTO: 1.4 10^3/UL (ref 1.5–3.5)
LYMPHOCYTES NFR BLD AUTO: 21.9 %
MAGNESIUM SERPL-MCNC: 1.9 MG/DL (ref 1.7–2.8)
MCH RBC QN AUTO: 29.8 PG (ref 27–31)
MCHC RBC AUTO-ENTMCNC: 33.5 G/DL (ref 32–36)
MCV RBC AUTO: 88.9 FL (ref 80–94)
MONOCYTES # BLD AUTO: 0.5 10^3/UL (ref 0–1)
MONOCYTES NFR BLD AUTO: 7.5 %
NEUTROPHILS # BLD AUTO: 4.3 10^3/UL (ref 1.5–6.6)
NEUTROPHILS # SNV AUTO: 6.4 X10^3/UL (ref 4.8–10.8)
NEUTROPHILS NFR BLD AUTO: 67.1 %
PDW BLD AUTO: 8 FL (ref 7.4–11.4)
PLATELET # BLD: 177 10^3/UL (ref 130–450)
PROT SPEC-MCNC: 6.4 G/DL (ref 6.7–8.2)
PROTHROM ACT/NOR PPP: 16.5 SECS (ref 9.9–12.6)
RBC MAR: 4.87 10^6/UL (ref 4.7–6.1)
SODIUM SERPLBLD-SCNC: 136 MMOL/L (ref 135–145)

## 2018-09-28 RX ADMIN — BRIMONIDINE TARTRATE SCH DROPS: 2 SOLUTION OPHTHALMIC at 08:27

## 2018-09-28 RX ADMIN — SODIUM CHLORIDE, PRESERVATIVE FREE SCH: 5 INJECTION INTRAVENOUS at 00:35

## 2018-09-28 RX ADMIN — SODIUM CHLORIDE, PRESERVATIVE FREE SCH: 5 INJECTION INTRAVENOUS at 09:26

## 2018-09-28 RX ADMIN — APIXABAN SCH MG: 5 TABLET, FILM COATED ORAL at 08:25

## 2018-09-28 RX ADMIN — SODIUM CHLORIDE SCH MLS/HR: 9 INJECTION, SOLUTION INTRAVENOUS at 00:31

## 2018-09-28 NOTE — DISCHARGE PLAN
Discharge Plan


Disposition: 01 Home, Self Care


Condition: Good


Prescriptions: 


Aspirin [Aspirin EC] 81 mg PO DAILY #30 tablet.


Diet: Regular


Activity Restrictions: No Restrictions


Shower Restrictions: No


Driving Restrictions: No


Weight Bearing: Full Weight


Additional Instructions or Follow Up instructions: 


You were admitted for TIA symptoms including memory loss and slight confusion.  





These symptoms resolved and all of the imaging showed no new findings.





Your pacemaker is stable, and you were continued on your antibiotic.  You should

finish all of your antibiotics as previously instructed.





The most likely cause of this episode was due to skipping Eliquis doses which 

was necessary to get your pacemaker implanted.  Eliquis was resumed, and you 

should continue at home.





Please see your PCP within one week and keep your post-pacemaker appointment.


No Smoking: If you smoke, Please STOP!  Call [phone] for help.


Follow-up with: 


Casimiro Hampton MD [Primary Care Provider] -

## 2018-09-28 NOTE — DISCHARGE SUMMARY
Discharge Summary


Admit Date: 09/27/18


Discharge Date: 09/28/18


Discharging Provider: DAVIDA Locke


Primary Care Provider: Casimiro Hampton


Code Status: Attempt Resuscitation


Condition at Discharge: Good


Discharge Disposition: 01 Home, Self Care





- DIAGNOSES


Admission Diagnoses: 


Transient cerebral ischemic attack, unspecified (G45.9) 


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 


Other amnesia (R41.3) 


Aphasia (R47.01) 


Long term (current) use of anticoagulants (Z79.01) 


Chronic atrial fibrillation (I48.2)





Discharge Diagnoses with Status of Each Condition: 


TIA (transient ischemic attack) (G45.9) symptoms resolved, stable.


Cardiac pacemaker (Z95.0) new, stable, wound is healing well.  Keflex to 

continue at home.


Memory loss (R41.3) resolved.


Expressive aphasia (R47.01) resolved.


Chronic anticoagulation (Z79.01) chronic, stable.


Chronic atrial fibrillation (I48.2) chronic, stable.





- HPI


History of Present Illness: 


Adrien Naidu is an 86-year old male with a past medical history of hypertension,

atrial fibrillation, status post pacemaker, COPD, TIA, and nocturia.  The 

patient presented to the ED with his wife who drove him here for TIA symptoms. 

The patient states that he awoke this morning feeling his normal self, and 

suddenly could not speak with acute memory loss.  He did not have any associated

symptoms such as shortness of breath, nausea, vomiting, dizziness, or chest 

pain.  His wife was present and did not notice any ataxia or facial droop.  The 

patient denies a new cough or problems with swallowing.  Imaging of the 

head/neck did not show any acute abnormalities or evidence of bleeding.  Upon my

exam the patient has no focal-neuro deficits and has had a resolution of his 

symptoms.  The patient will be admitted to observation for neuro checks, and an 

echocardiogram.





- HOSPITAL COURSE


Hospital Course: 





(1) TIA (transient ischemic attack)


The patient states that he awoke this morning feeling his normal self, and 

suddenly could not speak with acute memory loss.  He did not have any associated

symptoms such as shortness of breath, nausea, vomiting, dizziness, or chest 

pain.  His wife was present and did not notice any ataxia or facial droop.  The 

patient denies a new cough or problems with swallowing.  Imaging of the 

head/neck did not show any acute abnormalities or evidence of bleeding. 





(2) Memory loss 


The patient had memory loss in addition to his temporary expressive aphasia.  

The patient and his wife state that he could not recall common events or 

situations that should have been easily recalled on a normal day.  This symptom 

is no longer evident. 





(3) Expressive aphasia


The patient was noted to have sluggish speech, with word finding difficulties.  

This symptom went along with some short term memory loss.  This has resolved 

upon my exam and the patient is able to articulate accurately the course of 

events that led to this hospital stay.





(4) Chronic anticoagulation


The patient is prescribed Eliquis BID, which was resumed on the morning prior to

his presentation to the ED.  This medication was on hold for 72 hours so that 

the patient could undergo a pacemaker implant.  The patient states that between 

he and his wife, they are extremely compliant with taking their medications on 

time every day.  The patient was continued on Eliquis as at home and monitored 

for recurrent symptoms.





(5) Atrial fibrillation, chronic


The patient has had this for several years.  His current EKG and telemetry show 

a paced rhythm in the 60-70's.  He was previously anticoagulated with warfarin, 

but has recently been on Eliquis with good success.  He had an interruption in 

his Eliquis for about 72 hours to get his pacemaker, but this has been resumed.





Disposition:  The patient was anxious to return home with his wife and family.  

He remained symptom free, and was at his baseline.





- ALLERGIES


Allergies/Adverse Reactions: 


                                    Allergies











Allergy/AdvReac Type Severity Reaction Status Date / Time


 


No Known Drug Allergies Allergy   Verified 09/27/18 10:24














- MEDICATIONS


Home Medications: 


                                Ambulatory Orders











 Medication  Instructions  Recorded  Confirmed


 


Albuterol 2.5 mg INH Q4H PRN 09/23/18 09/27/18


 


Apixaban [Eliquis] 5 mg PO BID 09/23/18 09/27/18


 


Atorvastatin Calcium 40 mg PO QPM 09/23/18 09/27/18


 


Brimonidine 0.2% Ophth Drops 1 drops EACHEYE BID 09/23/18 09/27/18





[Alphagan P 0.2% Ophth Drops]   


 


Latanoprost 1 drops EACHEYE QPM 09/23/18 09/27/18


 


Losartan [Cozaar] 25 mg PO DAILY 09/23/18 09/27/18


 


Multivitamin [Multiple Vitamins] 1 tab PO DAILY 09/23/18 09/27/18


 


Tiotropium Bromide [Spiriva] 2 cap INH DAILY 09/23/18 09/27/18


 


Metoprolol Succinate 12.5 mg PO DAILY 09/27/18 09/27/18


 


Aspirin [Aspirin EC] 81 mg PO DAILY #30 tablet. 09/28/18 














- PHYSICAL EXAM AT DISCHARGE


General Appearance: positive: No acute distress, Alert


Eyes Bilateral: positive: Normal inspection


ENT: positive: ENT inspection nml, Pharynx nml, No signs of dehydration


Neck: positive: Nml inspection, Thyroid nml, No JVD


Respiratory: positive: Chest non-tender, No respiratory distress, Breath sounds 

nml


Cardiovascular: positive: No gallop, Irregularly irregular, Bradycardia, 

Systolic murmur


Peripheral Pulses: positive: 2+


Abdomen: positive: Non-tender, Nml bowel sounds


Back: positive: Nml inspection


Skin: positive: No rash, Warm, Dry


Extremities: positive: Non-tender, Full ROM, Nml appearance, No pedal edema


Neurologic/Psychiatric: positive: Oriented x3, CN's nml (2-12), Motor nml, 

Sensation nml, Mood/affect nml


Reflexes: Bicep (R): 3+, Bicep (L): 3+ ( )





- LABS


Result Diagrams: 


                                 09/28/18 05:56





                                 09/28/18 05:56





- DIAGNOSTIC IMAGING


Diagnostic Imaging Results: Final report reviewed


Diagnostic Imaging Results Comments: 


EXAM: CT HEAD 





EXAM DATE: 9/27/2018 11:23 AM. 


IMPRESSION: Generalized age-related cortical atrophic changes without evidence 

of acute intracranial abnormality. No intracranial hemorrhage, mass-effect, or 

CT evidence of acute infarct. 





EXAM: CT ANGIOGRAM NECK 





EXAM DATE: 9/27/2018 01:34 PM. 


IMPRESSION: 


1. No CTA evidence of high-grade stenosis, large vessel occlusion, acute 

dissection, aneurysm, or vascular malformation within extracranial arteries. 

Concurrently obtained CTA head has been dictated simply. 





2. Moderate atherosclerosis right carotid bifurcation and right carotid bulb, 

maximal stenosis of approximately 20%, mild by NASCET criteria . 





3. Approximately 10-20% narrowing origin of the right external carotid artery. 





4. Moderate to severe atherosclerosis left carotid bifurcation and left carotid 

bulb, maximal stenosis of approximately 50%, moderate by NASCET criteria. 





5. Approximately 30-40% narrowing proximal left external carotid artery. 





6. Approximately 30-40% narrowing origin of the left vertebral artery. 





EXAM: CT ANGIOGRAM HEAD. CT SCAN OF THE HEAD WITH CONTRAST. 





EXAM DATE: 9/27/2018 01:34 PM 


IMPRESSION: 1. Concurrently obtained noncontrast CT head has been dictated 

separate. 





2. No abnormal enhancement on the postcontrast CT head. 





3. No CTA evidence of high-grade stenosis, large vessel occlusion, acute dis

section, aneurysm, or vascular malformation within intracranial arteries. 





4. Moderate atherosclerosis right carotid siphon, maximal stenosis 30-40%. 





5. Moderate atherosclerosis left carotid siphon, maximal stenosis 20-30%. 





ECHOCARDIOGRAM: Final read by Braulio Valenzuela MD 


1.  Mild concentric LV hypertrophy.  


2.  Overall LV systolic function is lower limits of normal with an EF of 50-55%.

 


3.  Severe increase in the LA volume index.  


4.  Moderate RA enlargement. 


5.  There is mild to moderate mitral regurg. 


6.  Contrast injection of agitated saline was negative for an atrial shunt.





- FOLLOW UP


Follow Up: 


Disposition: 01 Home, Self Care


Condition: Good


Prescriptions: 


Aspirin [Aspirin EC] 81 mg PO DAILY #30 tablet.


Diet: Regular


Activity Restrictions: No Restrictions


Shower Restrictions: No


Driving Restrictions: No


Weight Bearing: Full Weight


Additional Instructions or Follow Up instructions: 


You were admitted for TIA symptoms including memory loss and slight confusion.  





These symptoms resolved and all of the imaging showed no new findings.





Your pacemaker is stable, and you were continued on your antibiotic.  You should

finish all of your antibiotics as previously instructed.





The most likely cause of this episode was due to skipping Eliquis doses which 

was necessary to get your pacemaker implanted.  Eliquis was resumed, and you 

should continue at home.





Please see your PCP within one week and keep your post-pacemaker appointment.








- TIME SPENT


Time Spent in Discharge (Minutes): 45

## 2020-05-11 ENCOUNTER — HOSPITAL ENCOUNTER (OUTPATIENT)
Dept: HOSPITAL 76 - COV | Age: 85
Discharge: HOME | End: 2020-05-11
Attending: FAMILY MEDICINE
Payer: MEDICARE

## 2020-05-11 DIAGNOSIS — R53.83: ICD-10-CM

## 2020-05-11 DIAGNOSIS — J02.9: ICD-10-CM

## 2020-05-11 DIAGNOSIS — R06.02: ICD-10-CM

## 2020-05-11 DIAGNOSIS — R19.7: ICD-10-CM

## 2020-05-11 DIAGNOSIS — M79.10: ICD-10-CM

## 2020-05-11 DIAGNOSIS — R05: Primary | ICD-10-CM

## 2020-05-11 PROCEDURE — 81599 UNLISTED MAAA: CPT
